# Patient Record
Sex: FEMALE | Race: AMERICAN INDIAN OR ALASKA NATIVE | HISPANIC OR LATINO
[De-identification: names, ages, dates, MRNs, and addresses within clinical notes are randomized per-mention and may not be internally consistent; named-entity substitution may affect disease eponyms.]

---

## 2020-12-30 ENCOUNTER — RX ONLY (OUTPATIENT)
Age: 45
Setting detail: RX ONLY
End: 2020-12-30

## 2020-12-30 ENCOUNTER — APPOINTMENT (RX ONLY)
Dept: URBAN - METROPOLITAN AREA CLINIC 184 | Facility: CLINIC | Age: 45
Setting detail: DERMATOLOGY
End: 2020-12-30

## 2020-12-30 DIAGNOSIS — L81.1 CHLOASMA: ICD-10-CM | Status: INADEQUATELY CONTROLLED

## 2020-12-30 PROCEDURE — ? PRESCRIPTION MEDICATION MANAGEMENT

## 2020-12-30 PROCEDURE — 99202 OFFICE O/P NEW SF 15 MIN: CPT

## 2020-12-30 PROCEDURE — ? PRESCRIPTION

## 2020-12-30 PROCEDURE — ? ADDITIONAL NOTES

## 2020-12-30 PROCEDURE — ? COUNSELING

## 2020-12-30 RX ORDER — TRANEXAMIC ACID 650 MG/1
TABLET, FILM COATED ORAL
Qty: 30 | Refills: 0 | Status: ERX | COMMUNITY
Start: 2020-12-30

## 2020-12-30 RX ORDER — HYDROQUINONE 4 %
CREAM (GRAM) TOPICAL
Qty: 1 | Refills: 1 | Status: ERX | COMMUNITY
Start: 2020-12-30

## 2020-12-30 RX ADMIN — TRANEXAMIC ACID: 650 TABLET, FILM COATED ORAL at 00:00

## 2020-12-30 ASSESSMENT — LOCATION SIMPLE DESCRIPTION DERM
LOCATION SIMPLE: RIGHT CHEEK
LOCATION SIMPLE: LEFT CHEEK

## 2020-12-30 ASSESSMENT — LOCATION DETAILED DESCRIPTION DERM
LOCATION DETAILED: LEFT INFERIOR CENTRAL MALAR CHEEK
LOCATION DETAILED: RIGHT INFERIOR CENTRAL MALAR CHEEK

## 2020-12-30 ASSESSMENT — LOCATION ZONE DERM: LOCATION ZONE: FACE

## 2020-12-30 NOTE — PROCEDURE: ADDITIONAL NOTES
Additional Notes: After discussion of the risks, benefits and limitations with respect to this Telehealth visit, including potential limitations in picture and video quality, the patient has given verbal consent to proceed with this Telehealth visit. Interactive audio and video telecommunications were used to permit real-time communication between myself and the patient.  This Telehealth visit was performed due to the national COVID-19 emergency and recommended social distancing.
Detail Level: Simple
Detail Level: Generalized
Additional Notes: *Discussed off-label use of tranexamic acid for treatment of melasma. Patient understands. Also discussed possible side effects including but not limited to hypercoagulability, blood clots, GI upset, headaches, myalgias, etc. Avoid pregnancy, avoid concurrent hormonal therapy or OCP use. Avoid smoking. Patient understands.

## 2020-12-30 NOTE — PROCEDURE: PRESCRIPTION MEDICATION MANAGEMENT
Modify Regimen: Hold tranxemaic acid at this point.\\nPlan to resume tranxemaic acid 650mg, 1/2 tablet daily (325mg daily) x 2 months only if no improvement with topical hydroquinone. Informed the two used together could increase efficacy based on recent studies.
Render In Strict Bullet Format?: No
Initiate Treatment: hydroquinone 4% cream BID to AA x 3 months then d/c\\nstrict sun protection recommended daily with spf 30 or greater
Detail Level: Zone

## 2020-12-30 NOTE — HPI: DISCOLORATION (MELASMA)
Include Thrombotic Event Screening In Note For Tranexamic Acid Therapy?: Yes
How Severe Are They?: moderate
Is This A New Presentation, Or A Follow-Up?: Follow Up Melasma
Additional History: patient has been treated over a year ago for her melasma with several topicals and tranexamic acid 650mg (1/2 tab) daily x 3 months on and off. Patient notes significant improvement with tranexamic acid, however ran out 3 months ago and now melasma is recurring. Not currently using sunscreen or topicals.

## 2021-03-31 ENCOUNTER — RX ONLY (OUTPATIENT)
Age: 46
Setting detail: RX ONLY
End: 2021-03-31

## 2021-03-31 ENCOUNTER — APPOINTMENT (RX ONLY)
Dept: URBAN - METROPOLITAN AREA CLINIC 203 | Facility: CLINIC | Age: 46
Setting detail: DERMATOLOGY
End: 2021-03-31

## 2021-03-31 DIAGNOSIS — L81.1 CHLOASMA: ICD-10-CM | Status: IMPROVED

## 2021-03-31 DIAGNOSIS — L30.8 OTHER SPECIFIED DERMATITIS: ICD-10-CM | Status: WELL CONTROLLED

## 2021-03-31 PROCEDURE — ? TREATMENT REGIMEN

## 2021-03-31 PROCEDURE — ? PRESCRIPTION

## 2021-03-31 PROCEDURE — ? ADDITIONAL NOTES

## 2021-03-31 PROCEDURE — ? MEDICATION COUNSELING

## 2021-03-31 PROCEDURE — 99214 OFFICE O/P EST MOD 30 MIN: CPT

## 2021-03-31 PROCEDURE — ? COUNSELING

## 2021-03-31 RX ORDER — TRIAMCINOLONE ACETONIDE 1 MG/G
CREAM TOPICAL BID
Qty: 1 | Refills: 1 | Status: ERX | COMMUNITY
Start: 2021-03-31

## 2021-03-31 RX ORDER — TRANEXAMIC ACID 650 MG/1
TABLET, FILM COATED ORAL
Qty: 30 | Refills: 2 | Status: ACTIVE

## 2021-03-31 RX ADMIN — TRIAMCINOLONE ACETONIDE: 1 CREAM TOPICAL at 00:00

## 2021-03-31 ASSESSMENT — LOCATION ZONE DERM
LOCATION ZONE: HAND
LOCATION ZONE: FACE

## 2021-03-31 ASSESSMENT — LOCATION DETAILED DESCRIPTION DERM
LOCATION DETAILED: LEFT THENAR EMINENCE
LOCATION DETAILED: LEFT INFERIOR CENTRAL MALAR CHEEK

## 2021-03-31 ASSESSMENT — LOCATION SIMPLE DESCRIPTION DERM
LOCATION SIMPLE: LEFT HAND
LOCATION SIMPLE: LEFT CHEEK

## 2021-03-31 NOTE — PROCEDURE: MEDICATION COUNSELING
Skin ulcer of sacrum, unspecified ulcer stage Benzoyl Peroxide Pregnancy And Lactation Text: This medication is Pregnancy Category C. It is unknown if benzoyl peroxide is excreted in breast milk.

## 2021-03-31 NOTE — PROCEDURE: TREATMENT REGIMEN
Continue Regimen: refilled Tranexamic Acid as patient's current medical insurance coverage ends today

## 2021-03-31 NOTE — PROCEDURE: TREATMENT REGIMEN
Plan: follow up with Dr. BEVAER and residents in future for refills on Tranexamic acid Plan: follow up with Dr. BEAVER and residents in future for refills on Tranexamic acid

## 2021-09-22 ENCOUNTER — RX ONLY (OUTPATIENT)
Age: 46
Setting detail: RX ONLY
End: 2021-09-22

## 2021-09-22 ENCOUNTER — APPOINTMENT (RX ONLY)
Dept: URBAN - METROPOLITAN AREA CLINIC 203 | Facility: CLINIC | Age: 46
Setting detail: DERMATOLOGY
End: 2021-09-22

## 2021-09-22 DIAGNOSIS — L20.89 OTHER ATOPIC DERMATITIS: ICD-10-CM | Status: INADEQUATELY CONTROLLED

## 2021-09-22 PROCEDURE — 99214 OFFICE O/P EST MOD 30 MIN: CPT | Mod: 95

## 2021-09-22 PROCEDURE — ? ADDITIONAL NOTES

## 2021-09-22 PROCEDURE — ? PRESCRIPTION

## 2021-09-22 PROCEDURE — ? COUNSELING

## 2021-09-22 PROCEDURE — ? TREATMENT REGIMEN

## 2021-09-22 RX ORDER — CRISABOROLE 20 MG/G
2% OINTMENT TOPICAL
Qty: 60 | Refills: 0 | Status: CANCELLED
Stop reason: SDUPTHER

## 2021-09-22 RX ORDER — CRISABOROLE 20 MG/G
2% OINTMENT TOPICAL
Qty: 60 | Refills: 0 | Status: CANCELLED
Stop reason: CLARIF

## 2021-09-22 RX ORDER — CRISABOROLE 20 MG/G
OINTMENT TOPICAL
Qty: 60 | Refills: 2 | Status: CANCELLED | COMMUNITY
Start: 2021-09-22

## 2021-09-22 RX ORDER — CRISABOROLE 20 MG/G
2% OINTMENT TOPICAL
Qty: 60 | Refills: 2 | Status: CANCELLED
Stop reason: SENT

## 2021-09-22 RX ADMIN — CRISABOROLE: 20 OINTMENT TOPICAL at 00:00

## 2021-09-22 ASSESSMENT — LOCATION DETAILED DESCRIPTION DERM
LOCATION DETAILED: RIGHT RADIAL DORSAL HAND
LOCATION DETAILED: RIGHT HYPOTHENAR EMINENCE

## 2021-09-22 ASSESSMENT — LOCATION ZONE DERM: LOCATION ZONE: HAND

## 2021-09-22 ASSESSMENT — LOCATION SIMPLE DESCRIPTION DERM: LOCATION SIMPLE: RIGHT HAND

## 2021-09-22 NOTE — PROCEDURE: TREATMENT REGIMEN
Otc Regimen: Aveeno body wash for hand cleansing throughout day, okay to continue Cetaphil for moisturizing
Detail Level: Zone
Initiate Treatment: Eucrisa BID to hands
Plan: Patient may be candidate for Dupixent

## 2021-09-22 NOTE — PROCEDURE: ADDITIONAL NOTES
Additional Notes: After discussion of the risks, benefits and limitations with respect to this Telehealth visit, including potential limitations in picture and video quality, the patient has given verbal consent to proceed with this Telehealth visit. \\nInteractive audio and video telecommunications were used to permit real-time communication between myself and the patient.\\nThis Telehealth visit was performed due to the national COVID-19 emergency and recommended social distancing.
Detail Level: Simple
Render Risk Assessment In Note?: no
Additional Notes: The [employer] video caller served as the audio-video platform for today's telehealth visit.

## 2024-02-29 NOTE — PROCEDURE: MEDICATION COUNSELING
Balbir Wilson  Orthopaedic Surgery  [address] [place] [address]  Phone: [phone]  Fax: [phone]  Follow Up Time: 4-6 Days   Niacinamide Counseling: I recommended taking niacin or niacinamide, also know as vitamin B3, twice daily. Recent evidence suggests that taking vitamin B3 (500 mg twice daily) can reduce the risk of actinic keratoses and non-melanoma skin cancers. Side effects of vitamin B3 include flushing and headache.
